# Patient Record
(demographics unavailable — no encounter records)

---

## 2025-06-10 NOTE — HISTORY OF PRESENT ILLNESS
[Delivery Date: ___] : on [unfilled] [Male] : Delivery History: baby boy [] : delivered by vaginal delivery [Wt. ___] : weighing [unfilled] [Clean/Dry/Intact] : clean, dry and intact [Erythema] : not erythematous [Infected] : did not appear infected [Dehiscence] : was not dehisced [de-identified] : pain at anus [FreeTextEntry8] : INT# [id number]   32yo  s/p   presents for episiotomy check.  Pt reports rectal pain with BM and flatulance.  Denies fever/ chills/ excessive VB.  Not taking any NSAID. [de-identified] : episiotomy intact, sutures palpable.  no exudate, appropriate incisional tenderness.   +hemorrhoids- no evidence of thrombosis.   [ ]Motrin q 6/ Colace/ witchzel pads/anusol.   RTC 4 wks for pp exam   Pepper, PAC [de-identified] : 30yo P1 s/p  6/5 here for exam of episiotomy-   pt reporting more rectal pain.

## 2025-07-15 NOTE — HISTORY OF PRESENT ILLNESS
[Postpartum Follow Up] : postpartum follow up [Complications:___] : no complications [Last Pap Date: ___] : Last Pap Date: [unfilled] [Delivery Date: ___] : on [unfilled] [] : delivered by vaginal delivery [Male] : Delivery History: baby boy [Wt. ___] : weighing [unfilled] [Breastfeeding] : currently nursing [None] : no vaginal bleeding [Normal] : the vagina was normal [Cervix Sample Taken] : cervical sample taken for a Pap smear [Not Done] : Examination of breasts not done [Doing Well] : is doing well [No Sign of Infection] : is showing no signs of infection [de-identified] : Int #: [id number]  31 y.o. S/P , .  Seen 6/10 d/t vaginal pain at episiotomy site and for hemorrhoids.  Pt said both areas have improved/decreased pain.  Pt w/  for baby Sandoval, [id number].  Exclusively breast feeding.  1) PP contraceoption.  Used condoms in past/ will resume use/ 2) Well woman [] pap collected - never had prior 3) PP depression.  Denies s/sxs [de-identified] : [] f/up PRN./annually  BOBBY Keyes, PAC